# Patient Record
Sex: FEMALE
[De-identification: names, ages, dates, MRNs, and addresses within clinical notes are randomized per-mention and may not be internally consistent; named-entity substitution may affect disease eponyms.]

---

## 2024-07-29 ENCOUNTER — P2P PATIENT RECORD (OUTPATIENT)
Age: 59
End: 2024-07-29

## 2024-12-03 ENCOUNTER — OFFICE VISIT (OUTPATIENT)
Dept: URBAN - METROPOLITAN AREA CLINIC 12 | Facility: CLINIC | Age: 59
End: 2024-12-03
Payer: COMMERCIAL

## 2024-12-03 ENCOUNTER — DASHBOARD ENCOUNTERS (OUTPATIENT)
Age: 59
End: 2024-12-03

## 2024-12-03 VITALS
TEMPERATURE: 97.3 F | HEART RATE: 76 BPM | WEIGHT: 190 LBS | SYSTOLIC BLOOD PRESSURE: 137 MMHG | DIASTOLIC BLOOD PRESSURE: 85 MMHG | HEIGHT: 68 IN | BODY MASS INDEX: 28.79 KG/M2

## 2024-12-03 DIAGNOSIS — R12 HEARTBURN: ICD-10-CM

## 2024-12-03 DIAGNOSIS — Z12.11 COLON CANCER SCREENING: ICD-10-CM

## 2024-12-03 DIAGNOSIS — R19.8 BORBORYGMI: ICD-10-CM

## 2024-12-03 DIAGNOSIS — R10.32 LLQ ABDOMINAL PAIN: ICD-10-CM

## 2024-12-03 DIAGNOSIS — E66.3 OVERWEIGHT (BMI 25.0-29.9): ICD-10-CM

## 2024-12-03 PROBLEM — 413681009: Status: ACTIVE | Noted: 2024-12-03

## 2024-12-03 PROBLEM — 162863004: Status: ACTIVE | Noted: 2024-12-03

## 2024-12-03 PROBLEM — 16331000: Status: ACTIVE | Noted: 2024-12-03

## 2024-12-03 PROBLEM — 305058001: Status: ACTIVE | Noted: 2024-12-03

## 2024-12-03 PROBLEM — 301716002: Status: ACTIVE | Noted: 2024-12-03

## 2024-12-03 PROCEDURE — 99204 OFFICE O/P NEW MOD 45 MIN: CPT | Performed by: INTERNAL MEDICINE

## 2024-12-03 NOTE — HPI-TODAY'S VISIT:
59F presents for GI eval on referral from Dr. Chapman.  Wants to have colonoscopy. Has BMs daily. No blood in stools. No melena. No anemia. No unintentional weight loss.  No fever or chills. Had episode of pain in left lower abdomen and had + UTI and also had pelvic ultrasound with gyn which was apparently negative. Completed course of anbiotics and pain resolved. No diarrhea.   No hx of diverticular disease for certain. No hx of recent CT scan or MRI abdomen. Had CCY in 2008 due to GS disease with pain. No chest pain or shortness of breath. Has never had a colonoscopy. No FHCC. No FHCP. States that she has "fizzy stomach" with sounds often during the day without pain for many years.  No diarrhea. No fever. Has taken Kombucha tea and VSL#3 with some improvement noted over the years. Has not caused other problems.  States that she has nasal polyps removed by ENT. States that she has had raspy voice at times and told by ENT that she has postnasal drip and states that she had "silent reflux" diagnosed by laryngoscopy and was confirmed by another ENT voice specialist. She does have heartburn at times. She was advised to take Prilosec and takes it prn. Has never had EGD or colonoscopoy.

## 2024-12-31 ENCOUNTER — OFFICE VISIT (OUTPATIENT)
Dept: URBAN - METROPOLITAN AREA SURGERY CENTER 18 | Facility: SURGERY CENTER | Age: 59
End: 2024-12-31

## 2025-01-10 ENCOUNTER — OFFICE VISIT (OUTPATIENT)
Dept: URBAN - METROPOLITAN AREA CLINIC 12 | Facility: CLINIC | Age: 60
End: 2025-01-10

## 2025-01-10 ENCOUNTER — OFFICE VISIT (OUTPATIENT)
Dept: URBAN - METROPOLITAN AREA TELEHEALTH 2 | Facility: TELEHEALTH | Age: 60
End: 2025-01-10
Payer: COMMERCIAL

## 2025-01-10 VITALS — HEIGHT: 68 IN | WEIGHT: 187 LBS | BODY MASS INDEX: 28.34 KG/M2

## 2025-01-10 DIAGNOSIS — Z86.0101 HISTORY OF ADENOMATOUS POLYP OF COLON: ICD-10-CM

## 2025-01-10 DIAGNOSIS — R10.32 LLQ ABDOMINAL PAIN: ICD-10-CM

## 2025-01-10 DIAGNOSIS — R12 HEARTBURN: ICD-10-CM

## 2025-01-10 PROBLEM — 429047008: Status: ACTIVE | Noted: 2025-01-10

## 2025-01-10 PROCEDURE — 99213 OFFICE O/P EST LOW 20 MIN: CPT

## 2025-01-10 RX ORDER — METFORMIN HYDROCHLORIDE 500 MG/1
1 TABLET WITH A MEAL TABLET, FILM COATED ORAL ONCE A DAY
Status: ACTIVE | COMMUNITY

## 2025-01-10 RX ORDER — ATORVASTATIN CALCIUM 10 MG/1
1 TABLET TABLET, FILM COATED ORAL ONCE A DAY
Status: ACTIVE | COMMUNITY

## 2025-01-10 NOTE — HPI-TODAY'S VISIT:
Pt is a 58 yo female presents today via telemedicine for an egd/colon f/u.  She was seen by Dr. Hernandez on 12/3/24 for colon screening, LLQ pain, and occasional heartburn.     Egd/colon on 12/31/24:   Colon: 20mm TA in Hepatic Flexure, 2 hyperplastic/mucosal polyp, diverticulosis in the left colon. IH. Repeat in 6 months recommended.    EGD: normal esophagus, gastritis bx neg for HP, IM or celiac.  Today pt reports feeling well overall. Denies GERD, n/v, epigastric pain or any other symptoms.  BMs are regular. No abd pain. Denies new symptoms or concerns today.

## 2025-03-24 ENCOUNTER — OFFICE VISIT (OUTPATIENT)
Dept: URBAN - METROPOLITAN AREA CLINIC 12 | Facility: CLINIC | Age: 60
End: 2025-03-24

## 2025-03-25 ENCOUNTER — OFFICE VISIT (OUTPATIENT)
Dept: URBAN - METROPOLITAN AREA CLINIC 12 | Facility: CLINIC | Age: 60
End: 2025-03-25
Payer: COMMERCIAL

## 2025-03-25 DIAGNOSIS — R10.32 LLQ ABDOMINAL PAIN: ICD-10-CM

## 2025-03-25 DIAGNOSIS — Z86.0101 HISTORY OF ADENOMATOUS POLYP OF COLON: ICD-10-CM

## 2025-03-25 DIAGNOSIS — K57.92 DIVERTICULITIS: ICD-10-CM

## 2025-03-25 PROBLEM — 307496006: Status: ACTIVE | Noted: 2025-03-25

## 2025-03-25 PROCEDURE — 99214 OFFICE O/P EST MOD 30 MIN: CPT

## 2025-03-25 RX ORDER — METFORMIN HYDROCHLORIDE 500 MG/1
1 TABLET WITH A MEAL TABLET, FILM COATED ORAL ONCE A DAY
Status: ACTIVE | COMMUNITY

## 2025-03-25 RX ORDER — ATORVASTATIN CALCIUM 10 MG/1
1 TABLET TABLET, FILM COATED ORAL ONCE A DAY
Status: ACTIVE | COMMUNITY

## 2025-03-25 NOTE — HPI-TODAY'S VISIT:
Patient is a 60-year-old female presenting today for a follow-up on diverticulitis. She was last seen by me on 1/10/2025 for a colonoscopy follow-up. She had a 20 mm TA in the hepatic flexure, and she was advised to repeat colonoscopy in 6 months for surveillance. Her repeat colonoscopy is scheduled for 7/18/2025 with Dr. Hernandez.  She reports a recent episode of diverticulitis on 2/19/2025. She developed low back pain radiating to LLQ and went to [facility] ER, where a CT scan confirmed diverticulitis. She completed a 10-day course of Augmentin and is currently asymptomatic. She denies abdominal pain, nausea, vomiting, fever, or chills. She notes a similar episode in 11/2024, but this was the first time diverticulitis was confirmed via CT.  She reports having regular daily bowel movements without constipation or diarrhea. There is no rectal bleeding. She denies a family history of CRC or diverticulitis.

## 2025-07-10 ENCOUNTER — TELEPHONE ENCOUNTER (OUTPATIENT)
Dept: URBAN - METROPOLITAN AREA CLINIC 23 | Facility: CLINIC | Age: 60
End: 2025-07-10

## 2025-07-18 ENCOUNTER — CLAIMS CREATED FROM THE CLAIM WINDOW (OUTPATIENT)
Dept: URBAN - METROPOLITAN AREA CLINIC 4 | Facility: CLINIC | Age: 60
End: 2025-07-18
Payer: COMMERCIAL

## 2025-07-18 ENCOUNTER — CLAIMS CREATED FROM THE CLAIM WINDOW (OUTPATIENT)
Dept: URBAN - METROPOLITAN AREA SURGERY CENTER 18 | Facility: SURGERY CENTER | Age: 60
End: 2025-07-18
Payer: COMMERCIAL

## 2025-07-18 DIAGNOSIS — K63.89 OTHER SPECIFIED DISEASES OF INTESTINE: ICD-10-CM

## 2025-07-18 DIAGNOSIS — F10.99 ALCOHOL USE, UNSPECIFIED WITH UNSPECIFIED ALCOHOL-INDUCED DISORDER: ICD-10-CM

## 2025-07-18 DIAGNOSIS — Z86.0100 PERSONAL HISTORY OF COLONIC POLYPS: ICD-10-CM

## 2025-07-18 DIAGNOSIS — K63.5 BENIGN COLON POLYP: ICD-10-CM

## 2025-07-18 DIAGNOSIS — D12.3 BENIGN NEOPLASM OF HEPATIC FLEXURE OF COLON: ICD-10-CM

## 2025-07-18 DIAGNOSIS — Z86.0101 H/O ADENOMATOUS POLYP OF COLON: ICD-10-CM

## 2025-07-18 PROCEDURE — 00811 ANES LWR INTST NDSC NOS: CPT | Performed by: NURSE ANESTHETIST, CERTIFIED REGISTERED

## 2025-07-18 PROCEDURE — G9998 DOC MED RSN <3 COLON: HCPCS | Performed by: INTERNAL MEDICINE

## 2025-07-18 PROCEDURE — 88305 TISSUE EXAM BY PATHOLOGIST: CPT | Performed by: PATHOLOGY

## 2025-07-18 PROCEDURE — 45385 COLONOSCOPY W/LESION REMOVAL: CPT | Performed by: INTERNAL MEDICINE

## 2025-07-18 RX ORDER — ATORVASTATIN CALCIUM 10 MG/1
1 TABLET TABLET, FILM COATED ORAL ONCE A DAY
Status: ACTIVE | COMMUNITY

## 2025-07-18 RX ORDER — METFORMIN HYDROCHLORIDE 500 MG/1
1 TABLET WITH A MEAL TABLET, FILM COATED ORAL ONCE A DAY
Status: ACTIVE | COMMUNITY